# Patient Record
Sex: FEMALE | Race: BLACK OR AFRICAN AMERICAN
[De-identification: names, ages, dates, MRNs, and addresses within clinical notes are randomized per-mention and may not be internally consistent; named-entity substitution may affect disease eponyms.]

---

## 2018-07-27 ENCOUNTER — HOSPITAL ENCOUNTER (EMERGENCY)
Dept: HOSPITAL 92 - ERS | Age: 33
Discharge: HOME | End: 2018-07-27
Payer: SELF-PAY

## 2018-07-27 DIAGNOSIS — F17.210: ICD-10-CM

## 2018-07-27 DIAGNOSIS — F32.9: ICD-10-CM

## 2018-07-27 DIAGNOSIS — K02.9: Primary | ICD-10-CM

## 2018-07-27 DIAGNOSIS — I10: ICD-10-CM

## 2018-07-27 PROCEDURE — 99282 EMERGENCY DEPT VISIT SF MDM: CPT

## 2018-11-19 ENCOUNTER — HOSPITAL ENCOUNTER (INPATIENT)
Dept: HOSPITAL 92 - L&D/OP | Age: 33
LOS: 3 days | Discharge: HOME | End: 2018-11-22
Attending: FAMILY MEDICINE | Admitting: FAMILY MEDICINE
Payer: COMMERCIAL

## 2018-11-19 VITALS — BODY MASS INDEX: 24.8 KG/M2

## 2018-11-19 DIAGNOSIS — Z3A.38: ICD-10-CM

## 2018-11-19 DIAGNOSIS — Z79.899: ICD-10-CM

## 2018-11-19 DIAGNOSIS — Z64.1: ICD-10-CM

## 2018-11-19 LAB
ALBUMIN SERPL BCG-MCNC: 3.4 G/DL (ref 3.5–5)
ALBUMIN SERPL BCG-MCNC: 3.7 G/DL (ref 3.5–5)
ALP SERPL-CCNC: 136 U/L (ref 40–150)
ALP SERPL-CCNC: 147 U/L (ref 40–150)
ALT SERPL W P-5'-P-CCNC: (no result) U/L (ref 8–55)
ALT SERPL W P-5'-P-CCNC: 8 U/L (ref 8–55)
ANION GAP SERPL CALC-SCNC: 13 MMOL/L (ref 10–20)
ANION GAP SERPL CALC-SCNC: 14 MMOL/L (ref 10–20)
AST SERPL-CCNC: 11 U/L (ref 5–34)
AST SERPL-CCNC: 12 U/L (ref 5–34)
BASOPHILS # BLD AUTO: 0.1 THOU/UL (ref 0–0.2)
BASOPHILS NFR BLD AUTO: 0.9 % (ref 0–1)
BILIRUB SERPL-MCNC: 0.3 MG/DL (ref 0.2–1.2)
BILIRUB SERPL-MCNC: 0.4 MG/DL (ref 0.2–1.2)
BUN SERPL-MCNC: 4 MG/DL (ref 7–18.7)
BUN SERPL-MCNC: 6 MG/DL (ref 7–18.7)
CALCIUM SERPL-MCNC: 9.3 MG/DL (ref 7.8–10.44)
CALCIUM SERPL-MCNC: 9.5 MG/DL (ref 7.8–10.44)
CHLORIDE SERPL-SCNC: 104 MMOL/L (ref 98–107)
CHLORIDE SERPL-SCNC: 106 MMOL/L (ref 98–107)
CO2 SERPL-SCNC: 20 MMOL/L (ref 22–29)
CO2 SERPL-SCNC: 21 MMOL/L (ref 22–29)
CREAT CL PREDICTED SERPL C-G-VRATE: 128 ML/MIN (ref 70–130)
CREAT CL PREDICTED SERPL C-G-VRATE: 136 ML/MIN (ref 70–130)
EOSINOPHIL # BLD AUTO: 0.1 THOU/UL (ref 0–0.7)
EOSINOPHIL NFR BLD AUTO: 0.6 % (ref 0–10)
GLOBULIN SER CALC-MCNC: 3.5 G/DL (ref 2.4–3.5)
GLOBULIN SER CALC-MCNC: 3.7 G/DL (ref 2.4–3.5)
GLUCOSE SERPL-MCNC: 103 MG/DL (ref 70–105)
GLUCOSE SERPL-MCNC: 75 MG/DL (ref 70–105)
HBSAG INDEX: 0.25 S/CO (ref 0–0.99)
HGB BLD-MCNC: 10.4 G/DL (ref 12–16)
HGB BLD-MCNC: 9.7 G/DL (ref 12–16)
LDH1 SERPL-CCNC: 153 U/L (ref 125–220)
LYMPHOCYTES # BLD: 1.8 THOU/UL (ref 1.2–3.4)
LYMPHOCYTES NFR BLD AUTO: 22.1 % (ref 21–51)
MCH RBC QN AUTO: 23.7 PG (ref 27–31)
MCH RBC QN AUTO: 23.8 PG (ref 27–31)
MCV RBC AUTO: 77.9 FL (ref 78–98)
MCV RBC AUTO: 78.4 FL (ref 78–98)
MONOCYTES # BLD AUTO: 0.3 THOU/UL (ref 0.11–0.59)
MONOCYTES NFR BLD AUTO: 4 % (ref 0–10)
NEUTROPHILS # BLD AUTO: 5.9 THOU/UL (ref 1.4–6.5)
NEUTROPHILS NFR BLD AUTO: 72.4 % (ref 42–75)
PLATELET # BLD AUTO: 303 THOU/UL (ref 130–400)
PLATELET # BLD AUTO: 304 THOU/UL (ref 130–400)
POTASSIUM SERPL-SCNC: 4 MMOL/L (ref 3.5–5.1)
POTASSIUM SERPL-SCNC: 4.5 MMOL/L (ref 3.5–5.1)
PROT UR-MCNC: 16 MG/DL (ref 1–14)
RBC # BLD AUTO: 4.08 MILL/UL (ref 4.2–5.4)
RBC # BLD AUTO: 4.38 MILL/UL (ref 4.2–5.4)
SODIUM SERPL-SCNC: 134 MMOL/L (ref 136–145)
SODIUM SERPL-SCNC: 135 MMOL/L (ref 136–145)
SYPHILIS ANTIBODY INDEX: 0.04 S/CO
URATE SERPL-MCNC: 5.4 MG/DL (ref 2.6–6)
WBC # BLD AUTO: 8.1 THOU/UL (ref 4.8–10.8)
WBC # BLD AUTO: 8.1 THOU/UL (ref 4.8–10.8)

## 2018-11-19 PROCEDURE — 87340 HEPATITIS B SURFACE AG IA: CPT

## 2018-11-19 PROCEDURE — 85027 COMPLETE CBC AUTOMATED: CPT

## 2018-11-19 PROCEDURE — 86780 TREPONEMA PALLIDUM: CPT

## 2018-11-19 PROCEDURE — 85025 COMPLETE CBC W/AUTO DIFF WBC: CPT

## 2018-11-19 PROCEDURE — 80053 COMPREHEN METABOLIC PANEL: CPT

## 2018-11-19 PROCEDURE — 36415 COLL VENOUS BLD VENIPUNCTURE: CPT

## 2018-11-19 PROCEDURE — 76805 OB US >/= 14 WKS SNGL FETUS: CPT

## 2018-11-19 PROCEDURE — 82570 ASSAY OF URINE CREATININE: CPT

## 2018-11-19 PROCEDURE — 86901 BLOOD TYPING SEROLOGIC RH(D): CPT

## 2018-11-19 PROCEDURE — 76819 FETAL BIOPHYS PROFIL W/O NST: CPT

## 2018-11-19 PROCEDURE — 86850 RBC ANTIBODY SCREEN: CPT

## 2018-11-19 PROCEDURE — 86900 BLOOD TYPING SEROLOGIC ABO: CPT

## 2018-11-19 PROCEDURE — 84550 ASSAY OF BLOOD/URIC ACID: CPT

## 2018-11-19 PROCEDURE — 99285 EMERGENCY DEPT VISIT HI MDM: CPT

## 2018-11-19 PROCEDURE — 83615 LACTATE (LD) (LDH) ENZYME: CPT

## 2018-11-19 PROCEDURE — 84156 ASSAY OF PROTEIN URINE: CPT

## 2018-11-19 NOTE — ULT
OB ULTRASOUND

OB NONSTRESS FETAL BIOPHYSICAL PROFILE:

 

Comparison: None. 

 

Technique: Nonstress biophysical profile was performed. Sagittal and transverse imaging of the gravid
 uterus was performed. 

 

FINDINGS: 

Single intrauterine gestation with vertex presentation. Anterior placenta. Current study is limited i
n the evaluation for previa. Cervix is not adequately demonstrated due to vertex presentation. 

 

Amniotic fluid index is 12.3 cm. 

 

Fetal biometry:

 

BPD             9.30 cm            37 weeks 6 days

HC            33.05 cm            37 weeks 4 days

AC            33.42 cm            37 weeks 2 days

FL             6.93 cm            35 weeks 4 days

 

Average age by sonography is 37 weeks 1 day. EMMANUEL is 12-9-18. Estimated fetal weight is 3087 grams, +/
- 457 grams. 

 

There are fetal hearts at the rate of 172 beats/minute. 

 

Nonstress fetal biophysical profile:

 

Fetal tone:       2 

Fetal breathin

Fetal movement:   2

Amniotic fluid:   2

 

Total: 8/8

 

IMPRESSION: 

Nonstress fetal biophysical profile with a score of 8/8. 

 

POS: [facility]

## 2018-11-19 NOTE — PDOC.LDPN
Labor & Delivery Progress Note





- Subjective


Subjective: comfortable, no concerns





- Objective


Vital signs reviewed and normal: yes


General: NAD, resting


Uterine fundus: non tender


Dilation: 3


Effacement: 75%


Station: -1


FHT: category 1, variability present


[place] contractions every: 2-4min





- Assessment


(1) IUP (intrauterine pregnancy), incidental


Code(s): Z34.90 - ENCNTR FOR SUPRVSN OF NORMAL PREGNANCY, UNSP, UNSP TRIMESTER 

  Current Visit: Yes   Status: Acute   





(2) Multigravida in third trimester


Code(s): Z34.83 - ENCOUNTER FOR SUPRVSN OF NORMAL PREGNANCY, THIRD TRIMESTER   

Current Visit: Yes   Status: Acute   


Plan: continue plan of care, pitocin for augmentation


-: 


IUP, eIOL


- patient resting comfortably


- no pre-E symptoms


- CTX every 2-4 min on the monitor, patient not feeling CTX


- last check at 2100 3/70/-1, will recheck at 0000


- Will continue to monitor VS


- Pitocin currently running


- FHR: 150s, mod variability, no decels, accels +, cat 2


- WIll continue to monitor


- continue plan of care





Hx of gHTN


- BP wnl


- will continue to monitor

## 2018-11-19 NOTE — PDOC.LDHP
Labor and Delivery H&P


Chief complaint: contractions, loss of fluid


HPI: 





Ms. Bain is a  at 38.5 weeks by a 3T US with unknown LMP that presents 

today with complaints of LOF (clear) since yesterday and not a definable 

contraction pattern. She reports the contraction pain as in her lower abdomen, 

back and buttocks. consisitent fluid leak and positive FM. she denies vaginal 

bleeding or discharge. 


Current gestational age (weeks): 38 (.5)


Due date: 18


Dating criteria: other (3T US)


Current pregnancy complications: other (late to care)


Abnormal US findings: No


Past Medical History: 





anemia in pregnancy, one  at 26 weeks, possible elevated pressures 

during previous preg. untreated 


Current medications: pre- vitamins, other (diclegis)


Previous surgical history: none


Social history: tobacco use





- Physical Exam


Vital signs reviewed and normal: yes


General: NAD, resting


Heart: RRR


Lungs: CTAB


Abdomen: NTTP


Extremeties: no edema


FHT: variability present (baseline 150, accelerations x2. reactive strip)





- Vaginal Exam


cm dilated: 3


Effacement: 75%


Station: -2





- OB Labs


Blood type: AB


RH: positive


Antibody Screen: positive


HIV: negative


RPR: negative


HEPSAg: negative


GBS: negative


Urine drug screen: negative


Rubella: immune





- Assessment





labor r/o 





- Plan


Plan: observation in L&D


-: 


- fetal/toco monitoring on L&D, monitor VS


- cervical exam with gel preformed on arrival, amnisure not useful at this point


- unchanged cervical exam from clinic 1 week ago 


- JESSI 11.5


- sterile spec neg for pooling/valsalva 


- recheck cervix in 2 hours


- DC with labor precautions if now change





<Juan Condon - Last Filed: 18 12:41>





<Judson Bradley - Last Filed: 18 16:59>


Allergies/Adverse Reactions: 


 Allergies











Allergy/AdvReac Type Severity Reaction Status Date / Time


 


No Known Allergies Allergy   Verified 18 10:07














Attending Addendum





- Attending Addendum


Date/Time: 18 7678





I personally evaluated the patient and discussed the management with Dr. Condon.


I agree with the History, Examination, Assessment and Plan documented above 

with any addition or exceptions noted below.


32 y.o.  EGA 38 5/7 wks by 3T US with unsure LMP, late onset to PNC, 

poor historian may have been treated for HTN in pregnancy in prior pregnancy 

here for c/o LOF, determined to be intact without ROM.  Over course of eval, pt'

s BP's have consistently run 130-140's/80-90's.  Urine Pr/Cr ratio 0.113, so 

Gestational HTN.  EFW today 3 kg.  BPP  with Cat1 strip.  SVE reported 3/75/-

2/posterior/medium.  IOL for Gest HTN at term.  Cytotec vs. Pitocin.  T&C 2UPRBC

's to hold in anticipation of PPH with grand multiparous uterine atony.





<Judson Bradley - Last Filed: 18 16:59>

## 2018-11-20 LAB
ALBUMIN SERPL BCG-MCNC: 3 G/DL (ref 3.5–5)
ALP SERPL-CCNC: 118 U/L (ref 40–150)
ALT SERPL W P-5'-P-CCNC: (no result) U/L (ref 8–55)
ANION GAP SERPL CALC-SCNC: 10 MMOL/L (ref 10–20)
AST SERPL-CCNC: 12 U/L (ref 5–34)
BILIRUB SERPL-MCNC: 0.4 MG/DL (ref 0.2–1.2)
BUN SERPL-MCNC: 4 MG/DL (ref 7–18.7)
CALCIUM SERPL-MCNC: 8.9 MG/DL (ref 7.8–10.44)
CHLORIDE SERPL-SCNC: 108 MMOL/L (ref 98–107)
CO2 SERPL-SCNC: 23 MMOL/L (ref 22–29)
CREAT CL PREDICTED SERPL C-G-VRATE: 136 ML/MIN (ref 70–130)
GLOBULIN SER CALC-MCNC: 3.4 G/DL (ref 2.4–3.5)
GLUCOSE SERPL-MCNC: 68 MG/DL (ref 70–105)
HGB BLD-MCNC: 9.5 G/DL (ref 12–16)
MCH RBC QN AUTO: 23.1 PG (ref 27–31)
MCV RBC AUTO: 77.7 FL (ref 78–98)
PLATELET # BLD AUTO: 265 THOU/UL (ref 130–400)
POTASSIUM SERPL-SCNC: 3.7 MMOL/L (ref 3.5–5.1)
PROT UR-MCNC: 44 MG/DL (ref 1–14)
RBC # BLD AUTO: 4.12 MILL/UL (ref 4.2–5.4)
SODIUM SERPL-SCNC: 137 MMOL/L (ref 136–145)
WBC # BLD AUTO: 13.9 THOU/UL (ref 4.8–10.8)

## 2018-11-20 RX ADMIN — Medication SCH: at 10:06

## 2018-11-20 RX ADMIN — DOCUSATE CALCIUM SCH MG: 240 CAPSULE, LIQUID FILLED ORAL at 21:42

## 2018-11-20 RX ADMIN — DOCUSATE CALCIUM SCH MG: 240 CAPSULE, LIQUID FILLED ORAL at 08:28

## 2018-11-20 RX ADMIN — Medication SCH: at 08:06

## 2018-11-20 NOTE — PDOC.OPDEL
Addendum entered and electronically signed by Antonia Payne MD  18 05:17

: 





Patient did not have an uneventful antepartum course -- patients antepartum 

course consisted of: late to care, gHTN, previous PPH, previous pre-term 

delivery, grand multiparity. 





Original Note:








OB Operative/Delivery Note


Delivery Dr/Surgeon: Leon Payne Frakes


Assist: Brice


Pre-Delivery Diagnosis: active labor


Procedure/Post Delivery Dx: spontaneous vaginal delivery


Weeks gestation: 38 (38.6wk)





- Findings


  ** A


Sex: male


Apgar - 1 min: 9


Apgar - 5 min: 9





- Additional Findings/Plan


Placenta delivered: spontaneous


Repaired Obstetrical Laceration: none


Estimated blood loss: 250ml


Compilations/Other Findings: 


This is 33yo F  no 7109 @ 38.6wks who delivered a viable M infant at 

0445 on 18. Following an uneventful antepartum course, a vigorous male 

was delivered over an intact perineum in the occipitoanterior position. 

Anterior Shoulder and then remainder of the body delivered. No nuchal cord. The 

head was held down and mouth and nares were bulb suctioned. Cord clamped (after 

delayed cord clamping) and cut and cord blood collected. Placenta delivered 

intact (in the [place] presentation) with a 3 vessel cord noted. Fundal massage 

was performed and the fundus was firm. The cervix and vagina were inspected and 

found to be free of lacerations. Cytotech used as a precaution due to hx of 

previous PPH. EBL 250ml. Infant went to  nursery in good condition for 

routine care. Apgars were 9/9 at 1 & 5 minutes, respectively. Patient tolerated 

delivery well and went to postpartum after routine recovery/care.


Post delivery plan: routine recovery





<Antonia Payne - Last Filed: 18 05:02>





Attending Addendum





- Attending Addendum


Date/Time: 18 0900





I personally directly supervised the .  No complications.  See resident 

noted.








<Blake Narvaez - Last Filed: 18 09:01>

## 2018-11-20 NOTE — PDOC.LDPN
Labor & Delivery Progress Note





- Subjective


Subjective: comfortable, no concerns





- Objective


Vital signs reviewed and normal: yes


General: NAD, resting, breathing through contractions


Uterine fundus: non tender


Dilation: 5


Effacement: 75%


Station: 0


FHT: category 1, variability present


[place] contractions every: every 2-4min





- Assessment


(1) IUP (intrauterine pregnancy), incidental


Code(s): Z34.90 - ENCNTR FOR SUPRVSN OF NORMAL PREGNANCY, UNSP, UNSP TRIMESTER 

  Current Visit: Yes   Status: Acute   





(2) Multigravida in third trimester


Code(s): Z34.83 - ENCOUNTER FOR SUPRVSN OF NORMAL PREGNANCY, THIRD TRIMESTER   

Current Visit: Yes   Status: Acute   


Plan: continue plan of care, pitocin for augmentation


-: 


IUP, eIOL


- patient resting, pt now feeling CTX ever 2-4min


- Pt given stadol for pain


- no pre-E symptoms


- CTX every 2-4 min


- last check at 0300 5/80/-0, will continue checks


- Will continue to monitor VS


- continue pitocin


- FHR: 130s, mod variability, no decels, accels +


- Will continue to monitor


- continue plan of care





Hx of gHTN


- BP wnl


- will continue to monitor

## 2018-11-20 NOTE — PDOC.LDPN
Labor & Delivery Progress Note





- Subjective


Subjective: painful contractions, vaginal pressure





- Objective


Vital signs reviewed and normal: yes


General: NAD, breathing through contractions


Uterine fundus: non tender


Dilation: 4


Effacement: 75%


Station: -1


FHT: category 1, variability present


[place] contractions every: every 2-4min





- Assessment


(1) IUP (intrauterine pregnancy), incidental


Code(s): Z34.90 - ENCNTR FOR SUPRVSN OF NORMAL PREGNANCY, UNSP, UNSP TRIMESTER 

  Current Visit: Yes   Status: Acute   





(2) Multigravida in third trimester


Code(s): Z34.83 - ENCOUNTER FOR SUPRVSN OF NORMAL PREGNANCY, THIRD TRIMESTER   

Current Visit: Yes   Status: Acute   


Plan: continue plan of care, pitocin for augmentation


-: 


IUP, eIOL


- patient resting, pt now feeling CTX ever 2-4min


- Pt given stadol for pain


- no pre-E symptoms


- CTX every 2-4 min


- last check at 0045 4/70/-1, will continue checks


- Will continue to monitor VS


- Pitocin currently running at 8


- FHR: 130s, mod variability, no decels, accels +


- Will continue to monitor


- continue plan of care





Hx of gHTN


- BP wnl


- will continue to monitor

## 2018-11-21 RX ADMIN — DOCUSATE CALCIUM SCH MG: 240 CAPSULE, LIQUID FILLED ORAL at 09:20

## 2018-11-21 RX ADMIN — DOCUSATE CALCIUM SCH MG: 240 CAPSULE, LIQUID FILLED ORAL at 21:15

## 2018-11-21 NOTE — PDOC.PP
Post Partum Progress Note


Post Partum Day #: 1


Subjective: 





No concerns. breast feeding well. pain controlled. 


PO intake tolerated: yes


Flatus: no


Ambulation: yes


 Vital Signs (12 hours)











  Temp Pulse Resp BP Pulse Ox


 


 18 03:45  98.0 F  70  18  119/73  98


 


 18 00:05  98.2 F  65  20  144/97 H  100


 


 18 19:50  98.6 F  75  20  136/83  100








 Weight











Weight                         69.853 kg

















- Physical Examination


General: NAD


Cardiovascular: no m/r/g, RRR


Respiratory: clear to auscultation bilaterally, non-labored breathing


Abdominal: + bowel sounds, lochia, no distention, appropriately TTP


Fundus firm & at: 3 cm below umbilicus 


Extremities: negative homans (B)


Neurological: no gross focal deficits


Psychiatric: A&Ox3, normal affect


Result Diagrams: 


 18 10:19





 18 10:19


Additional Labs: 


 Post Partum Labs











Blood Type  AB POSITIVE   18  14:22    


 


Hep Bs Antigen  Non-Reactive S/CO (NonReactive)   18  18:33    











(1) Gestational [pregnancy-induced] hypertension without significant proteinuria

, complicating childbirth


Code(s): O13.4 - GESTATNL HTN WITHOUT SIGNIFICANT PROTEIN, COMP CHILDBIRTH   

Status: Acute   





(2) Delivery normal


Code(s): O80 - ENCOUNTER FOR FULL-TERM UNCOMPLICATED DELIVERY   Status: Acute   





- Assessment/Plan





1. gHTN- Only one BP in past 24 hours >140/90. Labs WNL. Elevated protein/Cr 

ratio likely 2/2 lochia. Will continue to monitor. Possible d/c later today or 

tomorrow. 





2. Late Care- CM consulted





3. Grandmultiparity- no signs PPH. 





4. Breastfeeding- appears to be going well. 





5. Post partum: has not passed gas, will monitor. 





Dispo: D/C tomorrow 





<Stanislav Ledesma - Last Filed: 18 08:32>


 Vital Signs (12 hours)











  Temp Pulse Resp BP Pulse Ox


 


 18 07:57  98.4 F  72  20  139/87  100


 


 18 03:45  98.0 F  70  18  119/73  98


 


 18 00:05  98.2 F  65  20  144/97 H  100








 Weight











Weight                         69.853 kg














Result Diagrams: 


 18 10:19





 18 10:19


Additional Labs: 


 Post Partum Labs











Blood Type  AB POSITIVE   18  14:22    


 


Hep Bs Antigen  Non-Reactive S/CO (NonReactive)   18  18:33    














<Shanika Sanchez - Last Filed: 18 10:05>





Attending Addendum





- Attending Addendum


Date/Time: 18 1000





I personally evaluated the patient and discussed the management with Dr. Ledesma


I agree with the History, Examination, Assessment and Plan documented above 

with any addition or exceptions noted below.





33 yo  female s/p uncomplicated  on 18 at 0445


PPD#1


Doing well. Lochia appropriate. Breastfeeding. Tolerating PO well. Voiding 

without complications. Pain controlled. Ambulating. 





VS reviewed. Labs reviewed. 


Nonill appearing. No acute distress. Laying in bed. 


RRR. No murmurs.


CTA bilaterally. No C/W/R.


Nontender. Fundus firm below umbilicus.


FROM, nontender, no edema





1. s/p : Routine pp care. Meeting milestones. Continue to assist with 

breastfeeding. 


2. gHTN: Labs performed yesterday to rule out preeclampsia due to headache and 

elevated BP. BP normotensive. Headache resolved with meds. Continue to monitor 

BP throughout the day. Ok to use NSAIDs at this time for pain control. 


3. hx of PTL


4. hx of PPH: Lochia appropriate. H&H stable. 


5. Breastfeeding


6. Contraception: Unsure





Dispo: Continue routine pp care. Monitor BP and symptoms throughout the day. 

Likely d/c in AM. 





ABrayMD





<Shanika Sanchez - Last Filed: 18 10:05>

## 2018-11-22 VITALS — DIASTOLIC BLOOD PRESSURE: 83 MMHG | SYSTOLIC BLOOD PRESSURE: 120 MMHG | TEMPERATURE: 98.2 F

## 2018-11-22 RX ADMIN — DOCUSATE CALCIUM SCH MG: 240 CAPSULE, LIQUID FILLED ORAL at 09:52

## 2018-11-22 NOTE — PDOC.PP
Post Partum Progress Note


Post Partum Day #: 2


Subjective: 





Breast feeding well. states headache comes and goes. resolves with APAP or 

ibuprofen. Pain controlled. Would like to go home today. 


PO intake tolerated: yes


Flatus: yes


Ambulation: yes


 Vital Signs (12 hours)











  Temp Pulse Resp BP Pulse Ox


 


 18 03:27  98.1 F  89  18  128/75  97


 


 18 22:52  98.3 F  72  16  124/80 


 


 18 21:09  98.4 F  77  20  116/75  100








 Weight











Weight                         69.853 kg

















- Physical Examination


General: NAD


Cardiovascular: no m/r/g, RRR


Respiratory: clear to auscultation bilaterally, non-labored breathing


Abdominal: lochia, no distention, appropriately TTP


Fundus firm & at: 3 cm below umbilicus 


Extremities: negative homans (B)


Neurological: no gross focal deficits


Psychiatric: A&Ox3, normal affect


Result Diagrams: 


 18 10:19





 18 10:19


Additional Labs: 


 Post Partum Labs











Blood Type  AB POSITIVE   18  14:22    


 


Hep Bs Antigen  Non-Reactive S/CO (NonReactive)   18  18:33    











(1) Gestational [pregnancy-induced] hypertension without significant proteinuria

, complicating childbirth


Code(s): O13.4 - GESTATNL HTN WITHOUT SIGNIFICANT PROTEIN, COMP CHILDBIRTH   

Status: Acute   





(2) Delivery normal


Code(s): O80 - ENCOUNTER FOR FULL-TERM UNCOMPLICATED DELIVERY   Status: Acute   





- Assessment/Plan








1. gHTN- BP nomrmal within last 24 hours. discussed pre-eclampsia precautions. 





2. Late Care- CM consulted





3. Grandmultiparity- no signs PPH. 





4. Breastfeeding- appears to be going well. 





5. Post partum: Uncomplicated course, would like to have tubal ligation 

performed. Advised to f/u on Monday at [facility] and will arranged for tubal and 

depo shot until it can be performed. 





Dispo: D/C today. 





<Stanislav Ledesma - Last Filed: 18 08:02>


 Vital Signs (12 hours)











  Temp Pulse Resp BP Pulse Ox


 


 18 09:24  98.2 F  80  16  120/83  99


 


 18 03:27  98.1 F  89  18  128/75  97








 Weight











Weight                         69.853 kg














Result Diagrams: 


 18 10:19





 18 10:19


Additional Labs: 


 Post Partum Labs











Blood Type  AB POSITIVE   18  14:22    


 


Hep Bs Antigen  Non-Reactive S/CO (NonReactive)   18  18:33    














<Shanika Sanchez - Last Filed: 18 12:13>





Attending Addendum





- Attending Addendum


Date/Time: 18 1211





I personally evaluated the patient and discussed the management with Dr. Ledesma


I agree with the History, Examination, Assessment and Plan documented above 

with any addition or exceptions noted below.





33 yo  female s/p uncomplicated  on 18 at 0445


PPD#2


Doing well. Lochia appropriate. Breastfeeding. Tolerating PO well. Voiding 

without complications. Pain controlled. Ambulating. Requesting d/c. Headache 

improved/resolved. 





VS reviewed. Labs reviewed. 


Nonill appearing. No acute distress. Sitting in bed. 


RRR. No murmurs.


CTA bilaterally. No C/W/R.


Nontender. Fundus firm below umbilicus.


FROM, nontender, no edema





1. s/p : Routine pp care. Meeting milestones. Continue to encourage breast 

feeding. Ok to d/c to home. 


2. gHTN: Labs performed to rule out preeclampsia due to headache and elevated 

BP. BP remains normotensive. Headache resolved with meds. Ok to use NSAIDs at 

this time for pain control. Labs negative.  


3. hx of PTL


4. hx of PPH: Lochia appropriate. H&H stable. 


5. Breastfeeding


6. Contraception: Depo --> PP tubal





Dispo: D/c to home. Follow up in 1 wk. 





ABrayMD





<Shanika Sanchez - Last Filed: 18 12:13>